# Patient Record
Sex: FEMALE | Race: BLACK OR AFRICAN AMERICAN | Employment: UNEMPLOYED | URBAN - METROPOLITAN AREA
[De-identification: names, ages, dates, MRNs, and addresses within clinical notes are randomized per-mention and may not be internally consistent; named-entity substitution may affect disease eponyms.]

---

## 2021-06-07 ENCOUNTER — TRANSFERRED RECORDS (OUTPATIENT)
Dept: HEALTH INFORMATION MANAGEMENT | Facility: CLINIC | Age: 26
End: 2021-06-07
Payer: COMMERCIAL

## 2021-12-13 ENCOUNTER — MEDICAL CORRESPONDENCE (OUTPATIENT)
Dept: HEALTH INFORMATION MANAGEMENT | Facility: CLINIC | Age: 26
End: 2021-12-13
Payer: COMMERCIAL

## 2021-12-13 ENCOUNTER — TRANSCRIBE ORDERS (OUTPATIENT)
Dept: OTHER | Age: 26
End: 2021-12-13

## 2021-12-13 DIAGNOSIS — G93.89 ENCEPHALOMALACIA: ICD-10-CM

## 2021-12-13 DIAGNOSIS — G43.119 INTRACTABLE MIGRAINE WITH AURA WITHOUT STATUS MIGRAINOSUS: Primary | ICD-10-CM

## 2022-03-13 ENCOUNTER — HEALTH MAINTENANCE LETTER (OUTPATIENT)
Age: 27
End: 2022-03-13

## 2022-04-12 ENCOUNTER — HOSPITAL ENCOUNTER (EMERGENCY)
Facility: CLINIC | Age: 27
Discharge: HOME | End: 2022-04-12
Attending: EMERGENCY MEDICINE | Admitting: EMERGENCY MEDICINE
Payer: COMMERCIAL

## 2022-04-12 VITALS
RESPIRATION RATE: 18 BRPM | HEIGHT: 62 IN | WEIGHT: 110 LBS | HEART RATE: 102 BPM | DIASTOLIC BLOOD PRESSURE: 77 MMHG | OXYGEN SATURATION: 100 % | TEMPERATURE: 98.5 F | BODY MASS INDEX: 20.24 KG/M2 | SYSTOLIC BLOOD PRESSURE: 118 MMHG

## 2022-04-12 DIAGNOSIS — G89.29 CHRONIC INTRACTABLE HEADACHE, UNSPECIFIED HEADACHE TYPE: ICD-10-CM

## 2022-04-12 DIAGNOSIS — M79.602 PAIN OF LEFT UPPER EXTREMITY: ICD-10-CM

## 2022-04-12 DIAGNOSIS — R51.9 CHRONIC INTRACTABLE HEADACHE, UNSPECIFIED HEADACHE TYPE: ICD-10-CM

## 2022-04-12 PROBLEM — G47.33 OSA (OBSTRUCTIVE SLEEP APNEA): Status: ACTIVE | Noted: 2022-04-12

## 2022-04-12 PROBLEM — R76.8 ANA POSITIVE: Status: ACTIVE | Noted: 2019-11-19

## 2022-04-12 PROBLEM — R45.851 SUICIDAL IDEATION: Status: ACTIVE | Noted: 2017-03-25

## 2022-04-12 PROBLEM — J34.3 HYPERTROPHY OF INFERIOR NASAL TURBINATE: Status: ACTIVE | Noted: 2020-01-13

## 2022-04-12 PROBLEM — R10.13 EPIGASTRIC PAIN: Status: ACTIVE | Noted: 2017-04-04

## 2022-04-12 LAB
ALBUMIN SERPL-MCNC: 3.6 G/DL (ref 3.4–5)
ALP SERPL-CCNC: 74 U/L (ref 40–150)
ALT SERPL W P-5'-P-CCNC: 21 U/L (ref 0–50)
ANION GAP SERPL CALCULATED.3IONS-SCNC: 7 MMOL/L (ref 3–14)
AST SERPL W P-5'-P-CCNC: 9 U/L (ref 0–45)
BASOPHILS # BLD AUTO: 0 10E3/UL (ref 0–0.2)
BASOPHILS NFR BLD AUTO: 0 %
BILIRUB SERPL-MCNC: <0.1 MG/DL (ref 0.2–1.3)
BUN SERPL-MCNC: 9 MG/DL (ref 7–30)
CALCIUM SERPL-MCNC: 8.9 MG/DL (ref 8.5–10.1)
CHLORIDE BLD-SCNC: 109 MMOL/L (ref 94–109)
CO2 SERPL-SCNC: 21 MMOL/L (ref 20–32)
CREAT SERPL-MCNC: 0.61 MG/DL (ref 0.52–1.04)
CRP SERPL-MCNC: 11.9 MG/L (ref 0–8)
D DIMER PPP FEU-MCNC: 0.33 UG/ML FEU (ref 0–0.5)
EOSINOPHIL # BLD AUTO: 0.2 10E3/UL (ref 0–0.7)
EOSINOPHIL NFR BLD AUTO: 2 %
ERYTHROCYTE [DISTWIDTH] IN BLOOD BY AUTOMATED COUNT: 14.1 % (ref 10–15)
GFR SERPL CREATININE-BSD FRML MDRD: >90 ML/MIN/1.73M2
GLUCOSE BLD-MCNC: 127 MG/DL (ref 70–99)
HCT VFR BLD AUTO: 39.2 % (ref 35–47)
HGB BLD-MCNC: 12.7 G/DL (ref 11.7–15.7)
HOLD SPECIMEN: NORMAL
IMM GRANULOCYTES # BLD: 0 10E3/UL
IMM GRANULOCYTES NFR BLD: 0 %
LACTATE SERPL-SCNC: 1.1 MMOL/L (ref 0.7–2)
LYMPHOCYTES # BLD AUTO: 3 10E3/UL (ref 0.8–5.3)
LYMPHOCYTES NFR BLD AUTO: 33 %
MCH RBC QN AUTO: 27.5 PG (ref 26.5–33)
MCHC RBC AUTO-ENTMCNC: 32.4 G/DL (ref 31.5–36.5)
MCV RBC AUTO: 85 FL (ref 78–100)
MONOCYTES # BLD AUTO: 0.6 10E3/UL (ref 0–1.3)
MONOCYTES NFR BLD AUTO: 7 %
NEUTROPHILS # BLD AUTO: 5.1 10E3/UL (ref 1.6–8.3)
NEUTROPHILS NFR BLD AUTO: 58 %
NRBC # BLD AUTO: 0 10E3/UL
NRBC BLD AUTO-RTO: 0 /100
PLATELET # BLD AUTO: 380 10E3/UL (ref 150–450)
POTASSIUM BLD-SCNC: 3.9 MMOL/L (ref 3.4–5.3)
PROT SERPL-MCNC: 8.5 G/DL (ref 6.8–8.8)
RBC # BLD AUTO: 4.61 10E6/UL (ref 3.8–5.2)
SODIUM SERPL-SCNC: 137 MMOL/L (ref 133–144)
WBC # BLD AUTO: 8.9 10E3/UL (ref 4–11)

## 2022-04-12 PROCEDURE — 36415 COLL VENOUS BLD VENIPUNCTURE: CPT | Performed by: EMERGENCY MEDICINE

## 2022-04-12 PROCEDURE — 96375 TX/PRO/DX INJ NEW DRUG ADDON: CPT

## 2022-04-12 PROCEDURE — 99285 EMERGENCY DEPT VISIT HI MDM: CPT | Mod: 25

## 2022-04-12 PROCEDURE — 96374 THER/PROPH/DIAG INJ IV PUSH: CPT

## 2022-04-12 PROCEDURE — 83605 ASSAY OF LACTIC ACID: CPT | Performed by: EMERGENCY MEDICINE

## 2022-04-12 PROCEDURE — 80053 COMPREHEN METABOLIC PANEL: CPT | Performed by: EMERGENCY MEDICINE

## 2022-04-12 PROCEDURE — 85025 COMPLETE CBC W/AUTO DIFF WBC: CPT | Performed by: EMERGENCY MEDICINE

## 2022-04-12 PROCEDURE — 258N000003 HC RX IP 258 OP 636: Performed by: EMERGENCY MEDICINE

## 2022-04-12 PROCEDURE — 85379 FIBRIN DEGRADATION QUANT: CPT | Performed by: EMERGENCY MEDICINE

## 2022-04-12 PROCEDURE — 250N000011 HC RX IP 250 OP 636: Performed by: EMERGENCY MEDICINE

## 2022-04-12 PROCEDURE — 86140 C-REACTIVE PROTEIN: CPT | Performed by: EMERGENCY MEDICINE

## 2022-04-12 PROCEDURE — 96361 HYDRATE IV INFUSION ADD-ON: CPT

## 2022-04-12 RX ORDER — CETIRIZINE HYDROCHLORIDE 10 MG/1
10 TABLET ORAL
COMMUNITY
End: 2022-04-12

## 2022-04-12 RX ORDER — ERENUMAB-AOOE 140 MG/ML
140 INJECTION, SOLUTION SUBCUTANEOUS
COMMUNITY
Start: 2022-03-10 | End: 2022-06-15

## 2022-04-12 RX ORDER — KETOROLAC TROMETHAMINE 15 MG/ML
15 INJECTION, SOLUTION INTRAMUSCULAR; INTRAVENOUS ONCE
Status: COMPLETED | OUTPATIENT
Start: 2022-04-12 | End: 2022-04-12

## 2022-04-12 RX ORDER — TRAZODONE HYDROCHLORIDE 150 MG/1
150 TABLET ORAL
COMMUNITY
End: 2022-04-12

## 2022-04-12 RX ORDER — SODIUM CHLORIDE 9 MG/ML
INJECTION, SOLUTION INTRAVENOUS CONTINUOUS
Status: DISCONTINUED | OUTPATIENT
Start: 2022-04-12 | End: 2022-04-12 | Stop reason: HOSPADM

## 2022-04-12 RX ORDER — METFORMIN HCL 500 MG
500 TABLET, EXTENDED RELEASE 24 HR ORAL 2 TIMES DAILY WITH MEALS
COMMUNITY
Start: 2021-02-12

## 2022-04-12 RX ORDER — ONDANSETRON 4 MG/1
4 TABLET, ORALLY DISINTEGRATING ORAL
COMMUNITY
Start: 2020-06-25 | End: 2022-04-12

## 2022-04-12 RX ORDER — ALBUTEROL SULFATE 90 UG/1
2 AEROSOL, METERED RESPIRATORY (INHALATION) EVERY 4 HOURS PRN
COMMUNITY
Start: 2021-06-07

## 2022-04-12 RX ORDER — HYDROMORPHONE HCL IN WATER/PF 6 MG/30 ML
0.2 PATIENT CONTROLLED ANALGESIA SYRINGE INTRAVENOUS
Status: COMPLETED | OUTPATIENT
Start: 2022-04-12 | End: 2022-04-12

## 2022-04-12 RX ORDER — DIPHENHYDRAMINE HYDROCHLORIDE 50 MG/ML
25 INJECTION INTRAMUSCULAR; INTRAVENOUS ONCE
Status: COMPLETED | OUTPATIENT
Start: 2022-04-12 | End: 2022-04-12

## 2022-04-12 RX ADMIN — PROCHLORPERAZINE EDISYLATE 10 MG: 5 INJECTION INTRAMUSCULAR; INTRAVENOUS at 20:47

## 2022-04-12 RX ADMIN — DIPHENHYDRAMINE HYDROCHLORIDE 25 MG: 50 INJECTION, SOLUTION INTRAMUSCULAR; INTRAVENOUS at 20:45

## 2022-04-12 RX ADMIN — SODIUM CHLORIDE: 9 INJECTION, SOLUTION INTRAVENOUS at 19:20

## 2022-04-12 RX ADMIN — HYDROMORPHONE HYDROCHLORIDE 0.2 MG: 0.2 INJECTION, SOLUTION INTRAMUSCULAR; INTRAVENOUS; SUBCUTANEOUS at 19:21

## 2022-04-12 RX ADMIN — KETOROLAC TROMETHAMINE 15 MG: 15 INJECTION, SOLUTION INTRAMUSCULAR; INTRAVENOUS at 20:46

## 2022-04-12 ASSESSMENT — ENCOUNTER SYMPTOMS
WEAKNESS: 1
VOMITING: 0
DIARRHEA: 0
BACK PAIN: 1
SPEECH DIFFICULTY: 1
HEADACHES: 1
MYALGIAS: 1
DYSURIA: 0

## 2022-04-12 NOTE — ED TRIAGE NOTES
[facility]  ED Arrival Note    Arrives through triage. ABC's intact. A &O X4. . Pt arrives with c/o burning headache that started suddenly 1.5 hrs ago. Reports L arm and leg weakness and pain. Speech is slow and slurred at times.       Visitors during triage: Mother      Triage Interventions: Direct rooming     Ambulatory: Yes    Meets Stroke Criteria?: Stroke Eval    Meets Trauma Criteria?: No    Directed to: Main ED    Pronouns: she/her

## 2022-04-12 NOTE — ED NOTES
Bed: ED20  Expected date:   Expected time:   Means of arrival:   Comments:  Thunder clap headache triage

## 2022-04-12 NOTE — ED PROVIDER NOTES
History   Chief Complaint:  Headache and Arm Pain     HPI   Elizabeth More is a 27 year old female with history of encephalomalacia and type II diabetes mellitus who presents with headache and left arm pain. The patient reports that she has had months of constant burning head pain and left sided weakness. A couple weeks ago, she began having left sided arm and back pain that worsens with movement. She also has new onset facial burning. Her symptoms have been the worst today. No dysuria, vomiting, or nausea. No known injuries. The patient's mother states that she began slurring her words about an hour and a half ago due to the facial pain. She can normally walk independently but has trouble sleeping because of the intermittent pain. She was recently taken off all of her medications, and was instructed to be kept comfortable. Last neurology appointment was one month ago.    MRI Brain W/O Contrast - 03/11/2022  Impressions:  1. No acute intracranial pathology.  2. Chronic encephalomalacia in the left posterior sherman-sylvian region.  3. A few nonspecific patchy T2-hyperintensities in the white matter appear similar to the prior MRI from 2019 and could be the result of migraine headaches versus trauma, infectious, inflammatory, or vascular insults.    Review of Systems   HENT:        Facial pain   Gastrointestinal: Negative for diarrhea and vomiting.   Genitourinary: Negative for dysuria.   Musculoskeletal: Positive for back pain and myalgias.   Neurological: Positive for speech difficulty, weakness and headaches.   All other systems reviewed and are negative.      Allergies:  Influenza virus vaccines    Medications:  None.    Past Medical History:   Encephalomalacia  Asthma  Type II diabetes mellitus   Depression  Migraines  Insomnia  Borderline intellectual functioning  Suicidal ideation  Obstructive sleep apnea    Family History:    Mother- seizures     Social History:  Presents with mother  Lives with  "mother    Physical Exam     Patient Vitals for the past 24 hrs:   BP Temp Temp src Pulse Resp SpO2 Height Weight   04/12/22 2106 -- -- -- -- -- 100 % -- --   04/12/22 2100 118/77 -- -- 102 -- 100 % -- --   04/12/22 2032 113/82 -- -- 91 -- 100 % -- --   04/12/22 2000 122/84 -- -- 94 -- 97 % -- --   04/12/22 1930 125/82 -- -- 83 -- 100 % -- --   04/12/22 1924 -- -- -- -- -- 100 % -- --   04/12/22 1922 (!) 131/94 -- -- 113 -- -- -- --   04/12/22 1841 127/87 98.5  F (36.9  C) Temporal 110 18 98 % 1.575 m (5' 2\") 49.9 kg (110 lb)     Physical Exam  Nursing note and vitals reviewed.    Constitutional:  Appears uncomfortable.   HENT:     Nose normal.  No discharge.      Oral mucosa is dry.  Eyes:    Conjunctivae are normal without injection.  Pupils are equal.  Cardiovascular:  Tachycardic, regular rhythm with normal S1 and S2.      Normal heart sounds and peripheral pulses 2+ and equal.       No murmur or kasey.  Pulmonary:  Effort normal and breath sounds clear to auscultation bilaterally.     No respiratory distress.  No stridor.     No wheezes. No rales.     GI:    Soft. No distension and no mass. No tenderness.    Musculoskeletal:  Normal range of motion.  She has no edema in her arms or legs.  She has good motion of the right arm and both legs without pain.  However when I try to move any of her left arm where she tries to move it she cries out in pain.  There is no deformity.  She has a strong pulse, normal capillary refill.  There is no erythema.  Does not matter where I touch on her left arm she says it hurts.  Neurological:   Alert and appropriate.  Her speech is not slurred but she does not want to move her jaw or mouth and so she talks with thick speech without enunciating words because she does not want to move her lips.     Coordination normal.      GCS eye subscore is 4. GCS verbal subscore is 5.      GCS motor subscore is 6.   Skin:    Skin is warm and dry. No rash noted.  No erythema.  Psychiatric: "   Behavior is normal. Appropriate mood and affect.     Judgment and thought content normal.     Emergency Department Course     Laboratory:  Labs Ordered and Resulted from Time of ED Arrival to Time of ED Departure   COMPREHENSIVE METABOLIC PANEL - Abnormal       Result Value    Sodium 137      Potassium 3.9      Chloride 109      Carbon Dioxide (CO2) 21      Anion Gap 7      Urea Nitrogen 9      Creatinine 0.61      Calcium 8.9      Glucose 127 (*)     Alkaline Phosphatase 74      AST 9      ALT 21      Protein Total 8.5      Albumin 3.6      Bilirubin Total <0.1 (*)     GFR Estimate >90     CRP INFLAMMATION - Abnormal    CRP Inflammation 11.9 (*)    D DIMER QUANTITATIVE - Normal    D-Dimer Quantitative 0.33     LACTIC ACID WHOLE BLOOD - Normal    Lactic Acid 1.1     CBC WITH PLATELETS AND DIFFERENTIAL    WBC Count 8.9      RBC Count 4.61      Hemoglobin 12.7      Hematocrit 39.2      MCV 85      MCH 27.5      MCHC 32.4      RDW 14.1      Platelet Count 380      % Neutrophils 58      % Lymphocytes 33      % Monocytes 7      % Eosinophils 2      % Basophils 0      % Immature Granulocytes 0      NRBCs per 100 WBC 0      Absolute Neutrophils 5.1      Absolute Lymphocytes 3.0      Absolute Monocytes 0.6      Absolute Eosinophils 0.2      Absolute Basophils 0.0      Absolute Immature Granulocytes 0.0      Absolute NRBCs 0.0        Emergency Department Course:    Reviewed:  I reviewed nursing notes, vitals, past medical history and Care Everywhere    Assessments:  1846 I obtained history and examined the patient as noted above.   2123 I rechecked the patient. Patient states her headache is tolerable.      Interventions:  Medications   HYDROmorphone (DILAUDID) injection 0.2 mg (0.2 mg Intravenous Given 4/12/22 1921)   ketorolac (TORADOL) injection 15 mg (15 mg Intravenous Given 4/12/22 2046)   prochlorperazine (COMPAZINE) injection 10 mg (10 mg Intravenous Given 4/12/22 2047)   diphenhydrAMINE (BENADRYL) injection 25 mg  (25 mg Intravenous Given 4/12/22 2045)     Disposition:  The patient was discharged to home.    Impression & Plan     CMS Diagnoses: None    Medical Decision Making:  Patient comes in with multiple chronic complaints including headache due to encephalomalacia chronically, face pain tonight, and left arm pain.  Blood work was obtained and her CRP came back elevated 11.9 but is only mild elevation, lactate is normal at 1.1, comprehensive metabolic panel is normal and CBC is normal with a white count of 8.9.  Her D-dimer is normal.  She initially was given 0.2 mg of Dilaudid which helped her feel better.  I reviewed her MRI scan that was done in the last month and there was a suggestion that the findings could also represent chronic migraine headaches.  She was given 15 mg of Toradol, 10 of Compazine, and 25 of Benadryl.  Her headache was not much better but her arm pain was about gone.  She got up and ambulated without difficulty which her mom said was hard at home.  I do not feel she needs more advanced imaging.  No findings to suggest a stroke.  These are chronic problems and I want her to follow-up with your doctor in the clinic.  Mom is going to be taking her home or back to the group home and they will follow up with her doctor.    It is okay if she takes a dose of Tylenol or ibuprofen once in a while.  Schedule an appointment with her primary physician for a recheck and to talk about next steps.    Diagnosis:    ICD-10-CM    1. Chronic intractable headache, unspecified headache type  R51.9     G89.29    2. Pain of left upper extremity  M79.602        Discharge Medications:  Discharge Medication List as of 4/12/2022  9:33 PM          Scribe Disclosure:  I, Selena Aquino, am serving as a scribe at 6:48 PM on 4/12/2022 to document services personally performed by Kami Junior MD based on my observations and the provider's statements to me.            Kami Junior MD  04/13/22 0154

## 2022-04-13 NOTE — PHARMACY-ADMISSION MEDICATION HISTORY
Pharmacy Medication History  Admission medication history interview status for the 4/12/2022  admission is complete. See EPIC admission navigator for prior to admission medications     Location of Interview: Patient room  Medication history sources: Patient, Patient's family/friend (pt's mother) and Surescripts    Significant changes made to the medication list:      In the past week, patient estimated taking medication this percent of the time: n/a    Additional medication history information:       Medication reconciliation completed by provider prior to medication history? No    Time spent in this activity: 10 minutes    Prior to Admission medications    Medication Sig Last Dose Taking? Auth Provider   AIMOVIG 140 MG/ML injection Inject 140 mg Subcutaneous every 30 days 3/18/2022 Yes Reported, Patient   albuterol (PROAIR HFA/PROVENTIL HFA/VENTOLIN HFA) 108 (90 Base) MCG/ACT inhaler Inhale 2 puffs into the lungs every 4 hours as needed for shortness of breath / dyspnea prn Yes Reported, Patient   metFORMIN (GLUCOPHAGE-XR) 500 MG 24 hr tablet Take 500 mg by mouth 2 times daily (with meals) 4/11/2022 Yes Reported, Patient       The information provided in this note is only as accurate as the sources available at the time of update(s)

## 2022-04-13 NOTE — DISCHARGE INSTRUCTIONS
It is okay if she takes a dose of Tylenol or ibuprofen once in a while.  Schedule an appointment with her primary physician for a recheck and to talk about next steps.

## 2022-05-08 ENCOUNTER — HEALTH MAINTENANCE LETTER (OUTPATIENT)
Age: 27
End: 2022-05-08

## 2022-06-15 ENCOUNTER — TELEPHONE (OUTPATIENT)
Dept: NEUROLOGY | Facility: CLINIC | Age: 27
End: 2022-06-15

## 2022-06-15 ENCOUNTER — OFFICE VISIT (OUTPATIENT)
Dept: NEUROLOGY | Facility: CLINIC | Age: 27
End: 2022-06-15
Payer: COMMERCIAL

## 2022-06-15 VITALS — WEIGHT: 201 LBS | HEIGHT: 62 IN | BODY MASS INDEX: 36.99 KG/M2

## 2022-06-15 DIAGNOSIS — G43.109 MIGRAINE WITH AURA AND WITHOUT STATUS MIGRAINOSUS, NOT INTRACTABLE: Primary | ICD-10-CM

## 2022-06-15 PROBLEM — E66.01 MORBID OBESITY (H): Status: ACTIVE | Noted: 2022-06-15

## 2022-06-15 PROBLEM — R10.13 EPIGASTRIC PAIN: Status: RESOLVED | Noted: 2017-04-04 | Resolved: 2022-06-15

## 2022-06-15 PROCEDURE — 99205 OFFICE O/P NEW HI 60 MIN: CPT | Performed by: PSYCHIATRY & NEUROLOGY

## 2022-06-15 RX ORDER — TRAZODONE HYDROCHLORIDE 50 MG/1
TABLET, FILM COATED ORAL
COMMUNITY
Start: 2020-08-24

## 2022-06-15 NOTE — LETTER
6/15/2022         RE: Elizabeth More  [address]  [place] [address]        Dear Colleague,    Thank you for referring your patient, Elizabeth More, to the [facility] [place]. Please see a copy of my visit note below.    NEUROLOGY CONSULTATION NOTE       [facility] [place]  [address] [place] [address]  Tel: [phone]  Fax: [phone]  [email or web address]     Elizabeth More,  1995, MRN [id number]  PCP: Ana Lopez  Date: 06/15/2022     ASSESSMENT & PLAN     Visit Diagnosis  1. Migraine with aura and without status migrainosus, not intractable     Migraine headache with aura  27-year-old female with history of major depressive disorder, borderline intellectual functioning, RICARDO, DM, morbid obesity and migraine headache with aura who was referred for evaluation and management of headaches.  Patient is a poor historian and most of the history was gathered from the chart.  She has tried multiple medication in the past including Botox injection, Aimovig with minimal relief.  I have recommended switching to Emgality with a loading dose of 240 mg followed by maintenance dose of 120 mg every 28 days.  For abortive treatment she will use Ubrelvy 50 mg at onset of headache.  She can repeat 1 tablet every 2 hours if needed, maximum 4 tablets in 24 hours.  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  Patient is somewhat vague and does not know when she had CVA or if any work-up was done.  She thinks she was admitted to [facility] or [facility] when she was turning 15 but I could not find any relevant admissions during that time.  It is also not clear if she had any hypercoagulable work-up done  echocardiogram was done recently that showed no cardiac source of emboli.  If not done previously I would recommend hypercoagulable work-up, lipid profile and to continue on aspirin    Thank you again for this referral, please feel free to contact me if you have any questions.    Leonardo Kincaid MD  [facility]  (Former[facility], [facility]     REASON FOR CONSULTATION Headache        HISTORY OF PRESENT ILLNESS     We have been requested by Dr. Ana Lopez to evaluate Elizabeth More who is a 27 year old  female for migraine headache    Patient is a 27-year-old female with history of major depressive disorder, borderline intellectual functioning, RICARDO, DM, morbid obesity and migraine headache with aura who was referred for evaluation and management of headaches.  Patient is a poor historian and most of the information was gathered from the chart.  Apparently she started having headaches at the age of 14.  He usually gets an aura followed by photophobia and phonophobia she also occasionally gets nausea vomiting.  She had imaging studies done in the past that showed chronic ischemia in the left parietal and posterior superior left temporal lobe.  MRA was unremarkable.  She does not know when she had that CVA and is unable to provide any meaningful history.  In the past she was seen at [facility] and had Botox injection that did not seem to help.  She has tried multiple medication and recently was on Aimovig but that too is not very effective.  She tends to get these headaches almost on a daily basis and due to her history of CVA does not take any abortive medication as she is afraid she might have another stroke.  She cannot identify any triggers for her headaches     PROBLEM LIST   Patient Active Problem List   Diagnosis Code     IKE positive R76.8     Borderline intellectual functioning R41.83     Episodic mood disorder (H) F39     Hypertrophy of inferior  nasal turbinate J34.3     Major depressive disorder F32.9     Migraine with aura G43.109     Moderate persistent asthma J45.40     RICARDO (obstructive sleep apnea) G47.33     Suicidal ideation R45.851     Type 2 diabetes mellitus without complication (H) E11.9     Morbid obesity (H) E66.01         PAST MEDICAL & SURGICAL HISTORY     Past Medical History:   Patient  has no past medical history on file.    Surgical History:  She  has no past surgical history on file.     SOCIAL HISTORY     Reviewed, and she  reports that she has never smoked. She has never used smokeless tobacco. She reports previous alcohol use.     FAMILY HISTORY     Reviewed, and family history includes Hyperlipidemia in her maternal grandmother; Hypertension in her father and maternal grandmother; Migraines in her father, maternal aunt, maternal grandmother, and paternal aunt; Multiple Sclerosis in her father; Seizure Disorder in her mother.     ALLERGIES     Allergies   Allergen Reactions     Dust Mite Extract Shortness Of Breath     Influenza Vaccines Swelling         REVIEW OF SYSTEMS     A 12 point review of system was performed and was negative except as outlined in the history of present illness.     HOME MEDICATIONS     Current Outpatient Rx   Medication Sig Dispense Refill     albuterol (PROAIR HFA/PROVENTIL HFA/VENTOLIN HFA) 108 (90 Base) MCG/ACT inhaler Inhale 2 puffs into the lungs every 4 hours as needed for shortness of breath / dyspnea       galcanezumab-gnlm (EMGALITY) 120 MG/ML injection Inject 2 mLs (240 mg) Subcutaneous once for 1 dose 2 mL 0     [START ON 7/15/2022] galcanezumab-gnlm (EMGALITY) 120 MG/ML injection Inject 1 mL (120 mg) Subcutaneous every 28 days THIS IS MAINTENANCE PRESCRIPTION & SHOULD BE FILLED ON THE STARTING DATE 1 mL 11     metFORMIN (GLUCOPHAGE-XR) 500 MG 24 hr tablet Take 500 mg by mouth 2 times daily (with meals)       traZODone (DESYREL) 50 MG tablet TAKE 3 TABLETS BY MOUTH AT BEDTIME       ubrogepant  "(UBRELVY) 50 MG tablet Take 1 tablet (50 mg) by mouth at onset of headache (May repeat Q2Hour if needed. Maximum 4 tablets in 24 hours) 15 tablet 11         PHYSICAL EXAM     Vital signs  Ht 1.575 m (5' 2\")   Wt 91.2 kg (201 lb)   BMI 36.76 kg/m      Weight:   201 lbs 0 oz    Young overweight female who is alert and oriented x3 no acute distress.  Vital signs were reviewed and are documented in electronic medical record.  Neck supple.  Neurologically speech was normal.  Cranial nerves II through XII are intact.  Motor strength 5/5.  Reflexes 1+ toes downgoing.  No dysmetria noted on finger-nose testing gait normal     PERTINENT DIAGNOSTIC STUDIES     Following studies were reviewed:     MRI, MRA HEAD AND NECK 5/18/2022  HEAD MRI:   1.  Moderate zone of chronic ischemic change in the left parietal and posterior superior left temporal lobe, as detailed above.   2.  Scattered nonspecific foci of T2/FLAIR hyperintense signal in the cerebral white matter may be associated with gliosis or chronic myelin loss.   3.  No acute infarct, acute intracranial hemorrhage, or intracranial mass.     HEAD MRA:   1.  No aneurysm, high flow AVM or significant stenosis identified.   2.  Variant [place] of [place] anatomy as above.     NECK MRA:   1.  No significant stenosis of the bilateral internal carotid arteries based on NASCET criteria.    MRI BRAIN 3/11/2022  1. No acute intracranial pathology.   2. Chronic encephalomalacia in the left posterior perisylvian region.   3. A few nonspecific patchy T2-hyperintensities in the white matter appear similar to the prior MRI from 2019 and could be the result of migraine headaches versus trauma, infectious, inflammatory, or vascular insults.     ECHOCARDIOGRAM 5/10/2022  1. The left ventricular size is normal.  Systolic function is normal.     The   estimated ejection fraction is 65-70%.  Wall thickness is normal.  Left   ventricular segmental wall motion is normal.     2. The right " ventricular systolic function is normal..     3. Negative bubble study, with and without Valsalva.     4. Normal biatrial size.     5. Normal valvular structure and function.     6. Normal sinus rhythm during study.        PERTINENT LABS  Following labs were reviewed:  Admission on 04/12/2022, Discharged on 04/12/2022   Component Date Value     Hold Specimen 04/12/2022 JIC      Hold Specimen 04/12/2022 JIC      Hold Specimen 04/12/2022 JI      D-Dimer Quantitative 04/12/2022 0.33      Sodium 04/12/2022 137      Potassium 04/12/2022 3.9      Chloride 04/12/2022 109      Carbon Dioxide (CO2) 04/12/2022 21      Anion Gap 04/12/2022 7      Urea Nitrogen 04/12/2022 9      Creatinine 04/12/2022 0.61      Calcium 04/12/2022 8.9      Glucose 04/12/2022 127 (A)     Alkaline Phosphatase 04/12/2022 74      AST 04/12/2022 9      ALT 04/12/2022 21      Protein Total 04/12/2022 8.5      Albumin 04/12/2022 3.6      Bilirubin Total 04/12/2022 <0.1 (A)     GFR Estimate 04/12/2022 >90      Lactic Acid 04/12/2022 1.1      CRP Inflammation 04/12/2022 11.9 (A)     WBC Count 04/12/2022 8.9      RBC Count 04/12/2022 4.61      Hemoglobin 04/12/2022 12.7      Hematocrit 04/12/2022 39.2      MCV 04/12/2022 85      MCH 04/12/2022 27.5      MCHC 04/12/2022 32.4      RDW 04/12/2022 14.1      Platelet Count 04/12/2022 380      % Neutrophils 04/12/2022 58      % Lymphocytes 04/12/2022 33      % Monocytes 04/12/2022 7      % Eosinophils 04/12/2022 2      % Basophils 04/12/2022 0      % Immature Granulocytes 04/12/2022 0      NRBCs per 100 WBC 04/12/2022 0      Absolute Neutrophils 04/12/2022 5.1      Absolute Lymphocytes 04/12/2022 3.0      Absolute Monocytes 04/12/2022 0.6      Absolute Eosinophils 04/12/2022 0.2      Absolute Basophils 04/12/2022 0.0      Absolute Immature Granul* 04/12/2022 0.0      Absolute NRBCs 04/12/2022 0.0         Total time spent for face to face visit, reviewing labs/imaging studies, counseling and coordination of care  was: 1 Hour spent on the date of the encounter doing chart review, review of outside records, review of test results, interpretation of tests, patient visit and documentation       This note was dictated using voice recognition software.  Any grammatical or context distortions are unintentional and inherent to the software.    No orders of the defined types were placed in this encounter.     New Prescriptions    GALCANEZUMAB-GNLM (EMGALITY) 120 MG/ML INJECTION    Inject 2 mLs (240 mg) Subcutaneous once for 1 dose    GALCANEZUMAB-GNLM (EMGALITY) 120 MG/ML INJECTION    Inject 1 mL (120 mg) Subcutaneous every 28 days THIS IS MAINTENANCE PRESCRIPTION & SHOULD BE FILLED ON THE STARTING DATE    UBROGEPANT (UBRELVY) 50 MG TABLET    Take 1 tablet (50 mg) by mouth at onset of headache (May repeat Q2Hour if needed. Maximum 4 tablets in 24 hours)      Modified Medications    No medications on file              Again, thank you for allowing me to participate in the care of your patient.        Sincerely,        Leonardo Kincaid MD

## 2022-06-15 NOTE — TELEPHONE ENCOUNTER
Prior Authorization Retail Medication Request    Medication/Dose: Emgality 240mg/m; (loading dose) AND 120mg/ml (maintenance dose)  ICD code (if different than what is on RX):    Previously Tried and Failed:    Rationale:      Insurance Name:    Insurance ID:        Pharmacy Information (if different than what is on RX)  Name:    Phone:

## 2022-06-15 NOTE — NURSING NOTE
EXAM: MR BRAIN W/WO IV CONT, MR ANGIO HEAD WO IV CONT, MR ANGIO NECK W/WO IV CONT   LOCATION: [facility]   DATE/TIME: 5/18/2022 1:27 PM     INDICATION: Stroke follow-up.   COMPARISON: None.   CONTRAST: GADOBUTROL 1 MMOL/ML IV SOLN 9 mL   TECHNIQUE:   1) Routine multiplanar multisequence head MRI without and with intravenous contrast.   2) 3D time-of-flight head MRA without intravenous contrast.   3) Neck MRA without and with IV contrast. Stenosis measurements made according to NASCET criteria unless otherwise specified.     FINDINGS:   HEAD MRI:   INTRACRANIAL CONTENTS: No acute or subacute infarct. There is a moderate zone of encephalomalacia and parenchymal gliosis involving the left parietal lobe in the posterior left MCA vascular distribution. No extra-axial fluid collection. No mass effect or midline shift. Scattered nonspecific foci of T2/FLAIR hyperintense signal in the cerebral white matter. There is slight asymmetric dilatation of the atrium and occipital horn of the left lateral ventricle. No evidence of hydrocephalus. Normal position of the cerebellar tonsils. No pathologic contrast enhancement.     SELLA: No abnormality accounting for technique.     OSSEOUS STRUCTURES/SOFT TISSUES: Normal marrow signal. The major intracranial vascular flow voids are maintained.     ORBITS: No abnormality accounting for technique.     SINUSES/MASTOIDS: No paranasal sinus mucosal disease. No middle ear or mastoid effusion.     HEAD MRA:   ANTERIOR CIRCULATION: No stenosis/occlusion, aneurysm, or high flow vascular malformation. Standard [place] of [place] anatomy.     POSTERIOR CIRCULATION: No stenosis/occlusion, aneurysm, or high flow vascular malformation. Balanced vertebral arteries supply a normal basilar artery.     NECK MRA:   RIGHT CAROTID: No measurable stenosis or dissection.     LEFT CAROTID: No measurable stenosis or dissection.     VERTEBRAL ARTERIES: No focal stenosis or dissection. Dominant right and  smaller left vertebral arteries.     AORTIC ARCH: Classic aortic arch anatomy with no significant stenosis at the origin of the great vessels.     IMPRESSION:   HEAD MRI:   1.  Moderate zone of chronic ischemic change in the left parietal and posterior superior left temporal lobe, as detailed above.   2.  Scattered nonspecific foci of T2/FLAIR hyperintense signal in the cerebral white matter may be associated with gliosis or chronic myelin loss.   3.  No acute infarct, acute intracranial hemorrhage, or intracranial mass.     HEAD MRA:   1.  No aneurysm, high flow AVM or significant stenosis identified.   2.  Variant [place] of [place] anatomy as above.     NECK MRA:   1.  No significant stenosis of the bilateral internal carotid arteries based on NASCET criteria.

## 2022-06-15 NOTE — NURSING NOTE
Chief Complaint   Patient presents with     Headache     Chronic daily headaches- lightheadedness, visual disturbances- Imaging done via [place] [facility] 5/18/22. Is currently on Aimovig and does not feel that it helps      Natalee Campbell CMA on 6/15/2022 at 1:43 PM

## 2022-06-20 NOTE — TELEPHONE ENCOUNTER
PA Initiation    Medication: Emgality 240mg/m; (loading dose) AND 120mg/ml (maintenance dose)  Insurance Company: [employer] - Phone [phone] Fax [phone]  Pharmacy Filling the Rx: [id number] [address]  Filling Pharmacy Phone: [phone]  Filling Pharmacy Fax: [phone]  Start Date: 6/20/2022

## 2022-06-20 NOTE — TELEPHONE ENCOUNTER
Prior Authorization Approval    Authorization Effective Date: 5/21/2022  Authorization Expiration Date: 6/20/2023  Medication: Emgality 240mg/m; (loading dose) AND 120mg/ml (maintenance dose)  Approved Dose/Quantity:   Reference #: [id number]   Insurance Company: [employer] - Phone [phone] Fax [phone]  Which Pharmacy is filling the prescription (Not needed for infusion/clinic administered): [id number] [address]  Pharmacy Notified: Yes  Patient Notified: Yes

## 2023-01-14 ENCOUNTER — HEALTH MAINTENANCE LETTER (OUTPATIENT)
Age: 28
End: 2023-01-14

## 2023-06-02 ENCOUNTER — HEALTH MAINTENANCE LETTER (OUTPATIENT)
Age: 28
End: 2023-06-02

## 2023-07-03 DIAGNOSIS — G43.109 MIGRAINE WITH AURA AND WITHOUT STATUS MIGRAINOSUS, NOT INTRACTABLE: ICD-10-CM

## 2023-07-03 RX ORDER — GALCANEZUMAB 120 MG/ML
120 INJECTION, SOLUTION SUBCUTANEOUS
Qty: 1 ML | Refills: 0 | Status: SHIPPED | OUTPATIENT
Start: 2023-07-03 | End: 2023-08-07

## 2023-07-03 NOTE — LETTER
7/3/2023        RE: Elizabeth More  [address]  [place] [address]                Dear Elizabeth,         We recently provided you with medication refills.  Many medications require routine follow-up with your doctor.    Your prescription(s) have been refilled for 30 days so you may have time for the above noted follow-up. Please call to schedule soon so we can assure you have an appointment before your next refills are needed. If you have already made a follow up appointment, please disregard this letter.         Sincerely,        [facility] [place]     (Formerly known as [facility])  Dr. Kincaid's Care Team

## 2023-07-03 NOTE — TELEPHONE ENCOUNTER
Refill request for: Emgality 120mg/ml   Directions: Inject 1 mL (120 mg) Subcutaneous every 28 days     LOV: 06/15/22   NOV: No future appt scheduled. Letter mailed to pt to remind to schedule.    30 day supply with 0 refills Medication T'd for review and signature    Gina Cano LPN on 7/3/2023 at 12:43 PM

## 2023-07-16 ENCOUNTER — HEALTH MAINTENANCE LETTER (OUTPATIENT)
Age: 28
End: 2023-07-16

## 2023-08-06 DIAGNOSIS — G43.109 MIGRAINE WITH AURA AND WITHOUT STATUS MIGRAINOSUS, NOT INTRACTABLE: ICD-10-CM

## 2023-08-07 RX ORDER — GALCANEZUMAB 120 MG/ML
120 INJECTION, SOLUTION SUBCUTANEOUS
Qty: 1 ML | Refills: 0 | Status: SHIPPED | OUTPATIENT
Start: 2023-08-07 | End: 2023-09-06

## 2023-08-07 NOTE — TELEPHONE ENCOUNTER
Refill request for: Emgality 120mg/ml   Directions: Inject 1 mL (120 mg) Subcutaneous every 28 days      LOV: 06/15/22   NOV: No future appt scheduled. Letter mailed to pt to remind to schedule.     30 day supply with 0 refills Medication T'd for review and signature  Natalee Campbell CMA on 8/7/2023 at 1:29 PM

## 2023-11-09 NOTE — PROGRESS NOTES
NEUROLOGY FOLLOW UP VISIT  NOTE       [facility] [place]  [address] [place] [address]  Tel: [phone]  Fax: [phone]  [email or web address]     Elizabeth More,  1995, MRN [id number]  PCP: No Ref-Primary, Physician  Date: 11/10/2023      ASSESSMENT & PLAN     Visit Diagnosis  Migraine with aura and without status migrainosus, not intractable     Migraine headache with aura  28-year-old female with history of major depression, borderline intellectual functioning, RICARDO, DM, morbid obesity and migraine headache with aura who returns for follow-up.  Previously she had failed multiple medication but she finds Emgality to be very effective.  For abortive treatment she is using Ubrelvy.  I have cautioned her to minimize the use of Ubrelvy.  I have refilled her prescriptions both for Emgality and Ubrelvy.  Follow-up will be in 1 year    Thank you again for this referral, please feel free to contact me if you have any questions.    Leonardo Kincaid MD  [facility]  ([facility], [facility]     HISTORY OF PRESENT ILLNESS     Patient is a 28-year-old female with history of major depression, borderline intellectual functioning, RICARDO, DM, morbid obesity and migraine headache with aura last seen on 6/15/2022 who returns for follow-up.  In the past she had tried multiple medication including Botox, Aimovig with minimal relief.  She was switched to Emgality during her last visit and was told to use Ubrelvy for abortive treatment.  She reports improvement in her headache with Emgality and although initially she was using Ubrelvy infrequently lately she finds herself using it at least once a week.     Apparently she started having headaches at the age of 14.  He usually gets an aura followed by photophobia and phonophobia she also occasionally gets nausea vomiting.  She had imaging studies done in the past that showed  chronic ischemia in the left parietal and posterior superior left temporal lobe.  MRA was unremarkable.  She does not know when she had that CVA and is unable to provide any meaningful history.       PROBLEM LIST   Patient Active Problem List   Diagnosis Code     IKE positive R76.8     Borderline intellectual functioning R41.83     Episodic mood disorder (H24) F39     Hypertrophy of inferior nasal turbinate J34.3     Major depressive disorder F32.9     Migraine with aura G43.109     Moderate persistent asthma J45.40     RICARDO (obstructive sleep apnea) G47.33     Suicidal ideation R45.851     Type 2 diabetes mellitus without complication (H) E11.9     Morbid obesity (H) E66.01         PAST MEDICAL & SURGICAL HISTORY     Past Medical History:   Patient  has no past medical history on file.    Surgical History:  She  has no past surgical history on file.     SOCIAL HISTORY     Reviewed, and she  reports that she has never smoked. She has never used smokeless tobacco. She reports that she does not currently use alcohol.     FAMILY HISTORY     Reviewed, and family history includes Hyperlipidemia in her maternal grandmother; Hypertension in her father and maternal grandmother; Migraines in her father, maternal aunt, maternal grandmother, and paternal aunt; Multiple Sclerosis in her father; Seizure Disorder in her mother.     ALLERGIES     Allergies   Allergen Reactions     Dust Mite Extract Shortness Of Breath     Influenza Vaccines Swelling         REVIEW OF SYSTEMS     A 12 point review of system was performed and was negative except as outlined in the history of present illness.     HOME MEDICATIONS     Current Outpatient Rx   Medication Sig Dispense Refill     albuterol (PROAIR HFA/PROVENTIL HFA/VENTOLIN HFA) 108 (90 Base) MCG/ACT inhaler Inhale 2 puffs into the lungs every 4 hours as needed for shortness of breath / dyspnea       ALLERGY RELIEF 180 MG tablet Take 1 tablet by mouth daily at 2 pm       galcanezumab-kali  "(EMGALITY) 120 MG/ML injection Inject 120 mg Subcutaneous every 30 days       metFORMIN (GLUCOPHAGE-XR) 500 MG 24 hr tablet Take 500 mg by mouth 2 times daily (with meals)       traZODone (DESYREL) 50 MG tablet TAKE 3 TABLETS BY MOUTH AT BEDTIME       ubrogepant (UBRELVY) 50 MG tablet Take 1 tablet (50 mg) by mouth at onset of headache (May repeat Q2Hour if needed. Maximum 4 tablets in 24 hours) 15 tablet 11         PHYSICAL EXAM     Vital signs  /67 (BP Location: Left arm, Patient Position: Sitting)   Pulse 99   Ht 1.575 m (5' 2\")   Wt 86.2 kg (190 lb)   BMI 34.75 kg/m      Weight:   190 lbs 0 oz    Young overweight female who is alert and oriented x3 no acute distress.  Vital signs were reviewed and are documented in electronic medical record.  Neck supple.  Neurologically speech was normal.  Cranial nerves II through XII are intact.  Motor strength 5/5.  Reflexes 1+ toes downgoing.  No dysmetria noted on finger-nose testing gait normal      PERTINENT DIAGNOSTIC STUDIES     Following studies were reviewed:     MRI, MRA HEAD AND NECK 5/18/2022  HEAD MRI:   1.  Moderate zone of chronic ischemic change in the left parietal and posterior superior left temporal lobe, as detailed above.   2.  Scattered nonspecific foci of T2/FLAIR hyperintense signal in the cerebral white matter may be associated with gliosis or chronic myelin loss.   3.  No acute infarct, acute intracranial hemorrhage, or intracranial mass.     HEAD MRA:   1.  No aneurysm, high flow AVM or significant stenosis identified.   2.  Variant [place] of Jones anatomy as above.     NECK MRA:   1.  No significant stenosis of the bilateral internal carotid arteries based on NASCET criteria.     MRI BRAIN 3/11/2022  1. No acute intracranial pathology.   2. Chronic encephalomalacia in the left posterior perisylvian region.   3. A few nonspecific patchy T2-hyperintensities in the white matter appear similar to the prior MRI from 2019 and could be the " result of migraine headaches versus trauma, infectious, inflammatory, or vascular insults.      ECHOCARDIOGRAM 5/10/2022  1. The left ventricular size is normal.  Systolic function is normal.     The   estimated ejection fraction is 65-70%.  Wall thickness is normal.  Left   ventricular segmental wall motion is normal.     2. The right ventricular systolic function is normal..     3. Negative bubble study, with and without Valsalva.     4. Normal biatrial size.     5. Normal valvular structure and function.     6. Normal sinus rhythm during study.      PERTINENT LABS  Following labs were reviewed:  No visits with results within 3 Month(s) from this visit.   Latest known visit with results is:   Admission on 04/12/2022, Discharged on 04/12/2022   Component Date Value Ref Range Status     Hold Specimen 04/12/2022 JI   Final     Hold Specimen 04/12/2022 [facility]   Final     Hold Specimen 04/12/2022 [facility]   Final     D-Dimer Quantitative 04/12/2022 0.33  0.00 - 0.50 ug/mL FEU Final     Sodium 04/12/2022 137  133 - 144 mmol/L Final     Potassium 04/12/2022 3.9  3.4 - 5.3 mmol/L Final     Chloride 04/12/2022 109  94 - 109 mmol/L Final     Carbon Dioxide (CO2) 04/12/2022 21  20 - 32 mmol/L Final     Anion Gap 04/12/2022 7  3 - 14 mmol/L Final     Urea Nitrogen 04/12/2022 9  7 - 30 mg/dL Final     Creatinine 04/12/2022 0.61  0.52 - 1.04 mg/dL Final     Calcium 04/12/2022 8.9  8.5 - 10.1 mg/dL Final     Glucose 04/12/2022 127 (H)  70 - 99 mg/dL Final     Alkaline Phosphatase 04/12/2022 74  40 - 150 U/L Final     AST 04/12/2022 9  0 - 45 U/L Final     ALT 04/12/2022 21  0 - 50 U/L Final     Protein Total 04/12/2022 8.5  6.8 - 8.8 g/dL Final     Albumin 04/12/2022 3.6  3.4 - 5.0 g/dL Final     Bilirubin Total 04/12/2022 <0.1 (L)  0.2 - 1.3 mg/dL Final     GFR Estimate 04/12/2022 >90  >60 mL/min/1.73m2 Final     Lactic Acid 04/12/2022 1.1  0.7 - 2.0 mmol/L Final     CRP Inflammation 04/12/2022 11.9 (H)  0.0 - 8.0 mg/L Final     WBC  Count 04/12/2022 8.9  4.0 - 11.0 10e3/uL Final     RBC Count 04/12/2022 4.61  3.80 - 5.20 10e6/uL Final     Hemoglobin 04/12/2022 12.7  11.7 - 15.7 g/dL Final     Hematocrit 04/12/2022 39.2  35.0 - 47.0 % Final     MCV 04/12/2022 85  78 - 100 fL Final     MCH 04/12/2022 27.5  26.5 - 33.0 pg Final     MCHC 04/12/2022 32.4  31.5 - 36.5 g/dL Final     RDW 04/12/2022 14.1  10.0 - 15.0 % Final     Platelet Count 04/12/2022 380  150 - 450 10e3/uL Final     % Neutrophils 04/12/2022 58  % Final     % Lymphocytes 04/12/2022 33  % Final     % Monocytes 04/12/2022 7  % Final     % Eosinophils 04/12/2022 2  % Final     % Basophils 04/12/2022 0  % Final     % Immature Granulocytes 04/12/2022 0  % Final     NRBCs per 100 WBC 04/12/2022 0  <1 /100 Final     Absolute Neutrophils 04/12/2022 5.1  1.6 - 8.3 10e3/uL Final     Absolute Lymphocytes 04/12/2022 3.0  0.8 - 5.3 10e3/uL Final     Absolute Monocytes 04/12/2022 0.6  0.0 - 1.3 10e3/uL Final     Absolute Eosinophils 04/12/2022 0.2  0.0 - 0.7 10e3/uL Final     Absolute Basophils 04/12/2022 0.0  0.0 - 0.2 10e3/uL Final     Absolute Immature Granulocytes 04/12/2022 0.0  <=0.4 10e3/uL Final     Absolute NRBCs 04/12/2022 0.0  10e3/uL Final         Total time spent for face to face visit, reviewing labs/imaging studies, counseling and coordination of care was: 30 Minutes spent on the date of the encounter doing chart review, review of outside records, review of test results, interpretation of tests, patient visit, and documentation     This note was dictated using voice recognition software.  Any grammatical or context distortions are unintentional and inherent to the software.    No orders of the defined types were placed in this encounter.     New Prescriptions    No medications on file     Modified Medications    No medications on file

## 2023-11-10 ENCOUNTER — OFFICE VISIT (OUTPATIENT)
Dept: NEUROLOGY | Facility: CLINIC | Age: 28
End: 2023-11-10
Payer: COMMERCIAL

## 2023-11-10 VITALS
SYSTOLIC BLOOD PRESSURE: 101 MMHG | BODY MASS INDEX: 34.96 KG/M2 | HEIGHT: 62 IN | HEART RATE: 99 BPM | WEIGHT: 190 LBS | DIASTOLIC BLOOD PRESSURE: 67 MMHG

## 2023-11-10 DIAGNOSIS — G43.109 MIGRAINE WITH AURA AND WITHOUT STATUS MIGRAINOSUS, NOT INTRACTABLE: Primary | ICD-10-CM

## 2023-11-10 PROCEDURE — 99214 OFFICE O/P EST MOD 30 MIN: CPT | Performed by: PSYCHIATRY & NEUROLOGY

## 2023-11-10 RX ORDER — FEXOFENADINE HYDROCHLORIDE 180 MG/1
1 TABLET, FILM COATED ORAL
COMMUNITY
Start: 2023-08-08

## 2023-11-10 RX ORDER — GALCANEZUMAB 120 MG/ML
120 INJECTION, SOLUTION SUBCUTANEOUS
COMMUNITY
Start: 2023-07-03 | End: 2023-11-10

## 2023-11-10 RX ORDER — GALCANEZUMAB 120 MG/ML
120 INJECTION, SOLUTION SUBCUTANEOUS
Qty: 1 ML | Refills: 11 | Status: SHIPPED | OUTPATIENT
Start: 2023-11-10

## 2023-11-10 NOTE — LETTER
11/10/2023         RE: Elizabeth More  [address]  [place] [address]        Dear Colleague,    Thank you for referring your patient, Elizabeth More, to the [facility] [place]. Please see a copy of my visit note below.    NEUROLOGY FOLLOW UP VISIT  NOTE       [facility] [place]  [address] [place] [address]  Tel: [phone]  Fax: [phone]  [email or web address]     Elizabeth More,  1995, MRN [id number]  PCP: No Ref-Primary, Physician  Date: 11/10/2023      ASSESSMENT & PLAN     Visit Diagnosis  Migraine with aura and without status migrainosus, not intractable     Migraine headache with aura  28-year-old female with history of major depression, borderline intellectual functioning, RICARDO, DM, morbid obesity and migraine headache with aura who returns for follow-up.  Previously she had failed multiple medication but she finds Emgality to be very effective.  For abortive treatment she is using Ubrelvy.  I have cautioned her to minimize the use of Ubrelvy.  I have refilled her prescriptions both for Emgality and Ubrelvy.  Follow-up will be in 1 year    Thank you again for this referral, please feel free to contact me if you have any questions.    Leonardo Kincaid MD  [facility], [place]  (Former[facility], [facility]     HISTORY OF PRESENT ILLNESS     Patient is a 28-year-old female with history of major depression, borderline intellectual functioning, RICARDO, DM, morbid obesity and migraine headache with aura last seen on 6/15/2022 who returns for follow-up.  In the past she had tried multiple medication including Botox, Aimovig with minimal relief.  She was switched to Emgality during her last visit and was told to use Ubrelvy for abortive treatment.  She reports improvement in her headache with Emgality and although initially she was using Ubrelvy infrequently  lately she finds herself using it at least once a week.     Apparently she started having headaches at the age of 14.  He usually gets an aura followed by photophobia and phonophobia she also occasionally gets nausea vomiting.  She had imaging studies done in the past that showed chronic ischemia in the left parietal and posterior superior left temporal lobe.  MRA was unremarkable.  She does not know when she had that CVA and is unable to provide any meaningful history.       PROBLEM LIST   Patient Active Problem List   Diagnosis Code     IKE positive R76.8     Borderline intellectual functioning R41.83     Episodic mood disorder (H24) F39     Hypertrophy of inferior nasal turbinate J34.3     Major depressive disorder F32.9     Migraine with aura G43.109     Moderate persistent asthma J45.40     RICARDO (obstructive sleep apnea) G47.33     Suicidal ideation R45.851     Type 2 diabetes mellitus without complication (H) E11.9     Morbid obesity (H) E66.01         PAST MEDICAL & SURGICAL HISTORY     Past Medical History:   Patient  has no past medical history on file.    Surgical History:  She  has no past surgical history on file.     SOCIAL HISTORY     Reviewed, and she  reports that she has never smoked. She has never used smokeless tobacco. She reports that she does not currently use alcohol.     FAMILY HISTORY     Reviewed, and family history includes Hyperlipidemia in her maternal grandmother; Hypertension in her father and maternal grandmother; Migraines in her father, maternal aunt, maternal grandmother, and paternal aunt; Multiple Sclerosis in her father; Seizure Disorder in her mother.     ALLERGIES     Allergies   Allergen Reactions     Dust Mite Extract Shortness Of Breath     Influenza Vaccines Swelling         REVIEW OF SYSTEMS     A 12 point review of system was performed and was negative except as outlined in the history of present illness.     HOME MEDICATIONS     Current Outpatient Rx   Medication Sig  "Dispense Refill     albuterol (PROAIR HFA/PROVENTIL HFA/VENTOLIN HFA) 108 (90 Base) MCG/ACT inhaler Inhale 2 puffs into the lungs every 4 hours as needed for shortness of breath / dyspnea       ALLERGY RELIEF 180 MG tablet Take 1 tablet by mouth daily at 2 pm       galcanezumab-gnlm (EMGALITY) 120 MG/ML injection Inject 120 mg Subcutaneous every 30 days       metFORMIN (GLUCOPHAGE-XR) 500 MG 24 hr tablet Take 500 mg by mouth 2 times daily (with meals)       traZODone (DESYREL) 50 MG tablet TAKE 3 TABLETS BY MOUTH AT BEDTIME       ubrogepant (UBRELVY) 50 MG tablet Take 1 tablet (50 mg) by mouth at onset of headache (May repeat Q2Hour if needed. Maximum 4 tablets in 24 hours) 15 tablet 11         PHYSICAL EXAM     Vital signs  /67 (BP Location: Left arm, Patient Position: Sitting)   Pulse 99   Ht 1.575 m (5' 2\")   Wt 86.2 kg (190 lb)   BMI 34.75 kg/m      Weight:   190 lbs 0 oz    Young overweight female who is alert and oriented x3 no acute distress.  Vital signs were reviewed and are documented in electronic medical record.  Neck supple.  Neurologically speech was normal.  Cranial nerves II through XII are intact.  Motor strength 5/5.  Reflexes 1+ toes downgoing.  No dysmetria noted on finger-nose testing gait normal      PERTINENT DIAGNOSTIC STUDIES     Following studies were reviewed:     MRI, MRA HEAD AND NECK 5/18/2022  HEAD MRI:   1.  Moderate zone of chronic ischemic change in the left parietal and posterior superior left temporal lobe, as detailed above.   2.  Scattered nonspecific foci of T2/FLAIR hyperintense signal in the cerebral white matter may be associated with gliosis or chronic myelin loss.   3.  No acute infarct, acute intracranial hemorrhage, or intracranial mass.     HEAD MRA:   1.  No aneurysm, high flow AVM or significant stenosis identified.   2.  Variant [place] of Jones anatomy as above.     NECK MRA:   1.  No significant stenosis of the bilateral internal carotid arteries based on " NASCET criteria.     MRI BRAIN 3/11/2022  1. No acute intracranial pathology.   2. Chronic encephalomalacia in the left posterior perisylvian region.   3. A few nonspecific patchy T2-hyperintensities in the white matter appear similar to the prior MRI from 2019 and could be the result of migraine headaches versus trauma, infectious, inflammatory, or vascular insults.      ECHOCARDIOGRAM 5/10/2022  1. The left ventricular size is normal.  Systolic function is normal.     The   estimated ejection fraction is 65-70%.  Wall thickness is normal.  Left   ventricular segmental wall motion is normal.     2. The right ventricular systolic function is normal..     3. Negative bubble study, with and without Valsalva.     4. Normal biatrial size.     5. Normal valvular structure and function.     6. Normal sinus rhythm during study.      PERTINENT LABS  Following labs were reviewed:  No visits with results within 3 Month(s) from this visit.   Latest known visit with results is:   Admission on 04/12/2022, Discharged on 04/12/2022   Component Date Value Ref Range Status     Hold Specimen 04/12/2022 [facility]   Final     Hold Specimen 04/12/2022 [facility]I   Final     Hold Specimen 04/12/2022 [facility]   Final     D-Dimer Quantitative 04/12/2022 0.33  0.00 - 0.50 ug/mL FEU Final     Sodium 04/12/2022 137  133 - 144 mmol/L Final     Potassium 04/12/2022 3.9  3.4 - 5.3 mmol/L Final     Chloride 04/12/2022 109  94 - 109 mmol/L Final     Carbon Dioxide (CO2) 04/12/2022 21  20 - 32 mmol/L Final     Anion Gap 04/12/2022 7  3 - 14 mmol/L Final     Urea Nitrogen 04/12/2022 9  7 - 30 mg/dL Final     Creatinine 04/12/2022 0.61  0.52 - 1.04 mg/dL Final     Calcium 04/12/2022 8.9  8.5 - 10.1 mg/dL Final     Glucose 04/12/2022 127 (H)  70 - 99 mg/dL Final     Alkaline Phosphatase 04/12/2022 74  40 - 150 U/L Final     AST 04/12/2022 9  0 - 45 U/L Final     ALT 04/12/2022 21  0 - 50 U/L Final     Protein Total 04/12/2022 8.5  6.8 - 8.8 g/dL Final     Albumin  04/12/2022 3.6  3.4 - 5.0 g/dL Final     Bilirubin Total 04/12/2022 <0.1 (L)  0.2 - 1.3 mg/dL Final     GFR Estimate 04/12/2022 >90  >60 mL/min/1.73m2 Final     Lactic Acid 04/12/2022 1.1  0.7 - 2.0 mmol/L Final     CRP Inflammation 04/12/2022 11.9 (H)  0.0 - 8.0 mg/L Final     WBC Count 04/12/2022 8.9  4.0 - 11.0 10e3/uL Final     RBC Count 04/12/2022 4.61  3.80 - 5.20 10e6/uL Final     Hemoglobin 04/12/2022 12.7  11.7 - 15.7 g/dL Final     Hematocrit 04/12/2022 39.2  35.0 - 47.0 % Final     MCV 04/12/2022 85  78 - 100 fL Final     MCH 04/12/2022 27.5  26.5 - 33.0 pg Final     MCHC 04/12/2022 32.4  31.5 - 36.5 g/dL Final     RDW 04/12/2022 14.1  10.0 - 15.0 % Final     Platelet Count 04/12/2022 380  150 - 450 10e3/uL Final     % Neutrophils 04/12/2022 58  % Final     % Lymphocytes 04/12/2022 33  % Final     % Monocytes 04/12/2022 7  % Final     % Eosinophils 04/12/2022 2  % Final     % Basophils 04/12/2022 0  % Final     % Immature Granulocytes 04/12/2022 0  % Final     NRBCs per 100 WBC 04/12/2022 0  <1 /100 Final     Absolute Neutrophils 04/12/2022 5.1  1.6 - 8.3 10e3/uL Final     Absolute Lymphocytes 04/12/2022 3.0  0.8 - 5.3 10e3/uL Final     Absolute Monocytes 04/12/2022 0.6  0.0 - 1.3 10e3/uL Final     Absolute Eosinophils 04/12/2022 0.2  0.0 - 0.7 10e3/uL Final     Absolute Basophils 04/12/2022 0.0  0.0 - 0.2 10e3/uL Final     Absolute Immature Granulocytes 04/12/2022 0.0  <=0.4 10e3/uL Final     Absolute NRBCs 04/12/2022 0.0  10e3/uL Final         Total time spent for face to face visit, reviewing labs/imaging studies, counseling and coordination of care was: 30 Minutes spent on the date of the encounter doing chart review, review of outside records, review of test results, interpretation of tests, patient visit, and documentation     This note was dictated using voice recognition software.  Any grammatical or context distortions are unintentional and inherent to the software.    No orders of the defined  types were placed in this encounter.     New Prescriptions    No medications on file     Modified Medications    No medications on file                 Again, thank you for allowing me to participate in the care of your patient.        Sincerely,        Leonardo Kincaid MD

## 2023-11-10 NOTE — NURSING NOTE
Chief Complaint   Patient presents with    Migraine headache with aura     Annual follow up     Natalee Campbell CMA on 11/10/2023 at 12:18 PM  [facility]

## 2023-12-03 ENCOUNTER — HEALTH MAINTENANCE LETTER (OUTPATIENT)
Age: 28
End: 2023-12-03

## 2024-02-11 ENCOUNTER — HEALTH MAINTENANCE LETTER (OUTPATIENT)
Age: 29
End: 2024-02-11

## 2024-09-08 ENCOUNTER — HEALTH MAINTENANCE LETTER (OUTPATIENT)
Age: 29
End: 2024-09-08

## 2024-11-11 PROBLEM — E11.9 TYPE 2 DIABETES MELLITUS (H): Status: ACTIVE | Noted: 2019-06-11

## 2024-12-20 ENCOUNTER — HOSPITAL ENCOUNTER (EMERGENCY)
Facility: HOSPITAL | Age: 29
Discharge: HOME | End: 2024-12-20
Attending: EMERGENCY MEDICINE | Admitting: EMERGENCY MEDICINE
Payer: COMMERCIAL

## 2024-12-20 ENCOUNTER — APPOINTMENT (OUTPATIENT)
Dept: ULTRASOUND IMAGING | Facility: HOSPITAL | Age: 29
End: 2024-12-20
Payer: COMMERCIAL

## 2024-12-20 VITALS
OXYGEN SATURATION: 98 % | HEIGHT: 62 IN | SYSTOLIC BLOOD PRESSURE: 125 MMHG | RESPIRATION RATE: 18 BRPM | BODY MASS INDEX: 37.36 KG/M2 | HEART RATE: 90 BPM | DIASTOLIC BLOOD PRESSURE: 76 MMHG | WEIGHT: 203 LBS | TEMPERATURE: 98.5 F

## 2024-12-20 DIAGNOSIS — R10.9 ABDOMINAL PAIN: ICD-10-CM

## 2024-12-20 DIAGNOSIS — B37.31 YEAST INFECTION OF THE VAGINA: ICD-10-CM

## 2024-12-20 LAB
ALBUMIN SERPL BCG-MCNC: 4.4 G/DL (ref 3.5–5.2)
ALBUMIN UR-MCNC: 10 MG/DL
ALP SERPL-CCNC: 93 U/L (ref 40–150)
ALT SERPL W P-5'-P-CCNC: 14 U/L (ref 0–50)
ANION GAP SERPL CALCULATED.3IONS-SCNC: 13 MMOL/L (ref 7–15)
APPEARANCE UR: CLEAR
AST SERPL W P-5'-P-CCNC: 13 U/L (ref 0–45)
BACTERIA #/AREA URNS HPF: ABNORMAL /HPF
BILIRUB DIRECT SERPL-MCNC: <0.2 MG/DL (ref 0–0.3)
BILIRUB SERPL-MCNC: 0.2 MG/DL
BILIRUB UR QL STRIP: NEGATIVE
BUN SERPL-MCNC: 10.7 MG/DL (ref 6–20)
CALCIUM SERPL-MCNC: 9.7 MG/DL (ref 8.8–10.4)
CHLORIDE SERPL-SCNC: 105 MMOL/L (ref 98–107)
CLUE CELLS: ABNORMAL
COLOR UR AUTO: ABNORMAL
CREAT SERPL-MCNC: 0.75 MG/DL (ref 0.51–0.95)
EGFRCR SERPLBLD CKD-EPI 2021: >90 ML/MIN/1.73M2
ERYTHROCYTE [DISTWIDTH] IN BLOOD BY AUTOMATED COUNT: 14.4 % (ref 10–15)
GLUCOSE SERPL-MCNC: 118 MG/DL (ref 70–99)
GLUCOSE UR STRIP-MCNC: NEGATIVE MG/DL
HCG INTACT+B SERPL-ACNC: <1 MIU/ML
HCO3 SERPL-SCNC: 22 MMOL/L (ref 22–29)
HCT VFR BLD AUTO: 37.1 % (ref 35–47)
HGB BLD-MCNC: 12.2 G/DL (ref 11.7–15.7)
HGB UR QL STRIP: ABNORMAL
KETONES UR STRIP-MCNC: NEGATIVE MG/DL
LEUKOCYTE ESTERASE UR QL STRIP: NEGATIVE
LIPASE SERPL-CCNC: 18 U/L (ref 13–60)
MCH RBC QN AUTO: 27.9 PG (ref 26.5–33)
MCHC RBC AUTO-ENTMCNC: 32.9 G/DL (ref 31.5–36.5)
MCV RBC AUTO: 85 FL (ref 78–100)
MUCOUS THREADS #/AREA URNS LPF: PRESENT /LPF
NITRATE UR QL: NEGATIVE
PH UR STRIP: 5.5 [PH] (ref 5–7)
PLATELET # BLD AUTO: 360 10E3/UL (ref 150–450)
POTASSIUM SERPL-SCNC: 3.9 MMOL/L (ref 3.4–5.3)
PROT SERPL-MCNC: 7.8 G/DL (ref 6.4–8.3)
RBC # BLD AUTO: 4.38 10E6/UL (ref 3.8–5.2)
RBC URINE: 1 /HPF
SODIUM SERPL-SCNC: 140 MMOL/L (ref 135–145)
SP GR UR STRIP: 1.03 (ref 1–1.03)
SQUAMOUS EPITHELIAL: 2 /HPF
TRICHOMONAS, WET PREP: ABNORMAL
UROBILINOGEN UR STRIP-MCNC: <2 MG/DL
WBC # BLD AUTO: 8.1 10E3/UL (ref 4–11)
WBC URINE: 0 /HPF
WBC'S/HIGH POWER FIELD, WET PREP: ABNORMAL
YEAST, WET PREP: PRESENT

## 2024-12-20 PROCEDURE — 84702 CHORIONIC GONADOTROPIN TEST: CPT

## 2024-12-20 PROCEDURE — 87491 CHLMYD TRACH DNA AMP PROBE: CPT

## 2024-12-20 PROCEDURE — 85048 AUTOMATED LEUKOCYTE COUNT: CPT

## 2024-12-20 PROCEDURE — 96375 TX/PRO/DX INJ NEW DRUG ADDON: CPT

## 2024-12-20 PROCEDURE — 76830 TRANSVAGINAL US NON-OB: CPT

## 2024-12-20 PROCEDURE — 258N000003 HC RX IP 258 OP 636: Performed by: EMERGENCY MEDICINE

## 2024-12-20 PROCEDURE — 80053 COMPREHEN METABOLIC PANEL: CPT

## 2024-12-20 PROCEDURE — 85014 HEMATOCRIT: CPT

## 2024-12-20 PROCEDURE — 250N000011 HC RX IP 250 OP 636

## 2024-12-20 PROCEDURE — 99285 EMERGENCY DEPT VISIT HI MDM: CPT | Mod: 25

## 2024-12-20 PROCEDURE — 83690 ASSAY OF LIPASE: CPT

## 2024-12-20 PROCEDURE — 81003 URINALYSIS AUTO W/O SCOPE: CPT

## 2024-12-20 PROCEDURE — 250N000013 HC RX MED GY IP 250 OP 250 PS 637

## 2024-12-20 PROCEDURE — 82248 BILIRUBIN DIRECT: CPT

## 2024-12-20 PROCEDURE — 96374 THER/PROPH/DIAG INJ IV PUSH: CPT

## 2024-12-20 PROCEDURE — 82374 ASSAY BLOOD CARBON DIOXIDE: CPT

## 2024-12-20 PROCEDURE — 96361 HYDRATE IV INFUSION ADD-ON: CPT

## 2024-12-20 PROCEDURE — 87210 SMEAR WET MOUNT SALINE/INK: CPT

## 2024-12-20 PROCEDURE — 36415 COLL VENOUS BLD VENIPUNCTURE: CPT

## 2024-12-20 PROCEDURE — 93976 VASCULAR STUDY: CPT

## 2024-12-20 RX ORDER — KETOROLAC TROMETHAMINE 15 MG/ML
15 INJECTION, SOLUTION INTRAMUSCULAR; INTRAVENOUS ONCE
Status: COMPLETED | OUTPATIENT
Start: 2024-12-20 | End: 2024-12-20

## 2024-12-20 RX ORDER — DICYCLOMINE HCL 20 MG
20 TABLET ORAL 2 TIMES DAILY
Qty: 20 TABLET | Refills: 0 | Status: SHIPPED | OUTPATIENT
Start: 2024-12-20 | End: 2024-12-30

## 2024-12-20 RX ORDER — DOXYCYCLINE 100 MG/1
100 CAPSULE ORAL 2 TIMES DAILY
Qty: 13 CAPSULE | Refills: 0 | Status: SHIPPED | OUTPATIENT
Start: 2024-12-20 | End: 2024-12-27

## 2024-12-20 RX ORDER — ONDANSETRON 2 MG/ML
4 INJECTION INTRAMUSCULAR; INTRAVENOUS ONCE
Status: COMPLETED | OUTPATIENT
Start: 2024-12-20 | End: 2024-12-20

## 2024-12-20 RX ORDER — FLUCONAZOLE 150 MG/1
150 TABLET ORAL ONCE
Qty: 1 TABLET | Refills: 0 | Status: SHIPPED | OUTPATIENT
Start: 2024-12-20 | End: 2024-12-20

## 2024-12-20 RX ORDER — DOXYCYCLINE 100 MG/1
100 CAPSULE ORAL ONCE
Status: COMPLETED | OUTPATIENT
Start: 2024-12-20 | End: 2024-12-20

## 2024-12-20 RX ORDER — CEFTRIAXONE 500 MG/1
500 INJECTION, POWDER, FOR SOLUTION INTRAMUSCULAR; INTRAVENOUS ONCE
Status: DISCONTINUED | OUTPATIENT
Start: 2024-12-20 | End: 2024-12-20 | Stop reason: DRUGHIGH

## 2024-12-20 RX ADMIN — SODIUM CHLORIDE 1000 ML: 9 INJECTION, SOLUTION INTRAVENOUS at 13:45

## 2024-12-20 RX ADMIN — ONDANSETRON 4 MG: 2 INJECTION INTRAMUSCULAR; INTRAVENOUS at 13:45

## 2024-12-20 RX ADMIN — KETOROLAC TROMETHAMINE 15 MG: 15 INJECTION, SOLUTION INTRAMUSCULAR; INTRAVENOUS at 13:45

## 2024-12-20 RX ADMIN — DOXYCYCLINE 100 MG: 100 CAPSULE ORAL at 16:47

## 2024-12-20 ASSESSMENT — ACTIVITIES OF DAILY LIVING (ADL)
ADLS_ACUITY_SCORE: 41

## 2024-12-20 ASSESSMENT — COLUMBIA-SUICIDE SEVERITY RATING SCALE - C-SSRS
1. IN THE PAST MONTH, HAVE YOU WISHED YOU WERE DEAD OR WISHED YOU COULD GO TO SLEEP AND NOT WAKE UP?: NO
6. HAVE YOU EVER DONE ANYTHING, STARTED TO DO ANYTHING, OR PREPARED TO DO ANYTHING TO END YOUR LIFE?: NO
2. HAVE YOU ACTUALLY HAD ANY THOUGHTS OF KILLING YOURSELF IN THE PAST MONTH?: NO

## 2024-12-20 NOTE — ED PROVIDER NOTES
"Emergency Department Staff Physician Note     I had a face to face encounter with this patient seen by the Advanced Practice Provider (MILTON).  I have seen, examined, and discussed the patient with the MILTON and agree with their assessment and plan of management.    Relevant HPI:     Elizabeth More is a 29 year old female who presents to the Emergency Department for evaluation of abdominal pain.    3 days of lower abdominal pain with radiation to her back. 2 days ago, she was seen at [facility] with an unremarkable workup. Pain persisted and she went to [facility] 1 day ago with more unremarkable workup. Today, patient still not improved. She did note having nausea and vomiting that is now resolved.      I, Darius Delatorre, am serving as a scribe to document services personally performed by Ridge Bautista D.O., based on my observations and the provider's statements to me.   I, Ridge Bautista D.O., attest that Darius Delatorre was acting in a scribe capacity, has observed my performance of the services and has documented them in accordance with my direction.    ED Course:  12:57 PM I received the patient report from the MILTON, Laura Moralez PA-C. I agree with their assessment and plan of management, and I will see the patient.  1:05 PM I met with the patient to introduce myself, gather additional history, perform my initial exam, and discuss the plan.     Brief Physical Exam:  VITAL SIGNS: /76   Pulse 90   Temp 98.5  F (36.9  C) (Oral)   Resp 18   Ht 1.575 m (5' 2\")   Wt 92.1 kg (203 lb)   SpO2 98%   BMI 37.13 kg/m      General Appearance: Well-appearing, well-nourished, no acute distress   Head:  Normocephalic, without obvious abnormality, atraumatic  Eyes:  PERRL, conjunctiva/corneas clear, EOM's intact,  ENT:  Lips, mucosa, and tongue normal, membranes are moist without pallor  Neck:  Normal ROM, symmetrical, trachea midline    Cardio:  Regular rate and rhythm, no murmur, rub or gallop, 2+ pulses " symmetric in all extremities  Pulm:  Clear to auscultation bilaterally, respirations unlabored,   Abdomen:  Soft, no rebound or guarding. Bilateral lower quadrant tenderness.  Musculoskeletal: Full ROM, no edema, no cyanosis, good ROM of major joints  Integument:  Warm, Dry, No erythema, No rash.    Neurologic:  Alert & oriented.  No focal deficits appreciated.  Ambulatory.  Psychiatric:  Affect normal, Judgment normal, Mood normal.        LABS  Results for orders placed or performed during the hospital encounter of 12/20/24 (from the past 24 hours)   CBC with platelets   Result Value Ref Range    WBC Count 8.1 4.0 - 11.0 10e3/uL    RBC Count 4.38 3.80 - 5.20 10e6/uL    Hemoglobin 12.2 11.7 - 15.7 g/dL    Hematocrit 37.1 35.0 - 47.0 %    MCV 85 78 - 100 fL    MCH 27.9 26.5 - 33.0 pg    MCHC 32.9 31.5 - 36.5 g/dL    RDW 14.4 10.0 - 15.0 %    Platelet Count 360 150 - 450 10e3/uL   Basic metabolic panel   Result Value Ref Range    Sodium 140 135 - 145 mmol/L    Potassium 3.9 3.4 - 5.3 mmol/L    Chloride 105 98 - 107 mmol/L    Carbon Dioxide (CO2) 22 22 - 29 mmol/L    Anion Gap 13 7 - 15 mmol/L    Urea Nitrogen 10.7 6.0 - 20.0 mg/dL    Creatinine 0.75 0.51 - 0.95 mg/dL    GFR Estimate >90 >60 mL/min/1.73m2    Calcium 9.7 8.8 - 10.4 mg/dL    Glucose 118 (H) 70 - 99 mg/dL   Hepatic function panel   Result Value Ref Range    Protein Total 7.8 6.4 - 8.3 g/dL    Albumin 4.4 3.5 - 5.2 g/dL    Bilirubin Total 0.2 <=1.2 mg/dL    Alkaline Phosphatase 93 40 - 150 U/L    AST 13 0 - 45 U/L    ALT 14 0 - 50 U/L    Bilirubin Direct <0.20 0.00 - 0.30 mg/dL   Lipase   Result Value Ref Range    Lipase 18 13 - 60 U/L   HCG quantitative pregnancy (blood)   Result Value Ref Range    hCG Quantitative <1 <5 mIU/mL   UA with Microscopic reflex to Culture    Specimen: Urinary Bladder; Urine   Result Value Ref Range    Color Urine Light Yellow Colorless, Straw, Light Yellow, Yellow    Appearance Urine Clear Clear    Glucose Urine Negative  Negative mg/dL    Bilirubin Urine Negative Negative    Ketones Urine Negative Negative mg/dL    Specific Gravity Urine 1.028 1.001 - 1.030    Blood Urine 0.03 mg/dL (A) Negative    pH Urine 5.5 5.0 - 7.0    Protein Albumin Urine 10 (A) Negative mg/dL    Urobilinogen Urine <2.0 <2.0 mg/dL    Nitrite Urine Negative Negative    Leukocyte Esterase Urine Negative Negative    Bacteria Urine Few (A) None Seen /HPF    Mucus Urine Present (A) None Seen /LPF    RBC Urine 1 <=2 /HPF    WBC Urine 0 <=5 /HPF    Squamous Epithelials Urine 2 (H) <=1 /HPF    Narrative    Urine Culture not indicated   US Pelvis Cmplt w Transvag & Doppler LmtPel Duplex Limited    Narrative    EXAM: US PELVIS COMPLETE W TRANSVAGINAL AND DOPPLER LIMITED  LOCATION: [facility]  DATE: 12/20/2024    INDICATION: Lower abdominal pain, increased vaginal discharge, eval for TOA signs of PID  COMPARISON: CT AP 12/19/2024 [facility]  TECHNIQUE: Transabdominal scans were performed. Endovaginal ultrasound was performed to better visualize the adnexa. Color flow with spectral Doppler and waveform analysis performed.    FINDINGS:    UTERUS: 6 x 3 x 2 cm. Normal in size and position with no masses.    ENDOMETRIUM: 2 mm. Normal smooth endometrium.    RIGHT OVARY: 1.9 x 1.8 x 1.4 cm. Normal with arterial and venous duplex flow identified.    LEFT OVARY: 2.5 x 1.4 x 1.2 cm. Normal with arterial and venous duplex flow identified.    No free fluid.      Impression    IMPRESSION:    1.  Normal pelvic ultrasound.         Wet prep    Specimen: Vagina; Swab   Result Value Ref Range    Trichomonas Absent Absent    Yeast Present (A) Absent    Clue Cells Absent Absent    WBCs/high power field 2+ (A) None         RADIOLOGY  US Pelvis Cmplt w Transvag & Doppler LmtPel Duplex Limited   Final Result   IMPRESSION:     1.  Normal pelvic ultrasound.                        Impression / ED Plan:  I had a face to face encounter with this patient seen by the  Advanced Practice Provider (MILTON). I personally made/approved the management plan and take responsibility for the patient management. I personally saw patient and performed a substantive portion of the visit including all aspects of the medical decision making.     Elizabeth More is a 29 year old female presents to the ED for evaluation of lower abdominal pain.  Patient has had this pain for the past few days.  Previously seen at [facility].  Did have CT imaging performed there, which did not show any evidence of acute process.  Came in today due to continuing symptoms.  On exam her pain was primarily located in the lower abdomen, without definitive focal tenderness.  There was no rebound tenderness to her exam.  She does not appear septic, and is otherwise quite comfortable on exam today.  Based on clinical exam and history given, I did not believe CT imaging was indicated again, especially with the lab testing that does not show an elevation in her white blood cell count, was otherwise largely unremarkable.  However they did not perform previous pelvic exam or pelvic ultrasound at her visit at [facility], therefore we will perform these today.  Performed by the MILTON, there was small amount of discharge, but otherwise the exam was largely unremarkable.  Pelvic ultrasound did not show any evidence of ovarian cyst or torsion or obvious significant free fluid.  As a precaution, with the discharge that was noted on exam we we will treat prophylactically for potential STI, though based on the history given I believe it to be of low likelihood.  Do not see evidence of other emergent process and will discharge with plan for outpatient follow-up with primary care provider.  Return precautions were discussed.    1. Yeast infection of the vagina    2. Abdominal pain        Ridge Bautista D.O.  Emergency Medicine  [facility] EMERGENCY DEPARTMENT  [facility] [facility]  [id number]  [phone]  Dept: [phone]       Ridge Bautista,   12/23/24 0814

## 2024-12-20 NOTE — DISCHARGE INSTRUCTIONS
You were seen in the emergency department for evaluation of abdominal pain.  It was found that you have a yeast infection.  We discussed covering for sexually transmitted infections and decided that this was a good idea.  You got an IV medication here and 1 dose of an oral medication as well.  You need to take the pills that I prescribed you, doxycycline, 2 times a day for the next 7 days.  You got 1 dose here tonight you do not need to take it until tomorrow morning.  Then please take 2 times a day until the pills are gone.  Take the pill for the yeast infection, fluconazole, as prescribed.  This is just a one-time dose.     Use Tylenol and ibuprofen as needed for your abdominal pain.  Use the Bentyl that I prescribed you as well to help with the abdominal cramping.  Use the Zofran that you have at home as needed for nausea and vomiting.    Schedule an appoint with your primary care provider for an emergency department follow-up.    Return to the emergency department for fever, uncontrollable pain, uncontrollable vomiting, or any other concerning symptoms.

## 2024-12-20 NOTE — ED TRIAGE NOTES
Pt presents with the c/o lower abdominal pain that radiates to groin with nausea x 3 days.  Reports increased frequency with urination.  No pain.  Normal bowel.     Triage Assessment (Adult)       Row Name 12/20/24 1233          Triage Assessment    Airway WDL WDL        Respiratory WDL    Respiratory WDL WDL

## 2024-12-20 NOTE — ED NOTES
Patient stated her ride was here to pick her up and needed to leave. Writer explained that we were waiting on medication from pharmacy, patient stated she did not want to wait for med because she was going to take the PO dose that was prescribed.

## 2024-12-20 NOTE — ED PROVIDER NOTES
Emergency Department Encounter   NAME: Elizabeth More  AGE: 29 year old female  YOB: 1995  MRN: [id number]    PCP: No Ref-Primary, Physician  ED PROVIDER: Laura Moralez PA-C    Evaluation Date & Time:   No admission date for patient encounter.    CHIEF COMPLAINT:  Abdominal Pain      Impression and Plan   MDM: 30 yo F with a history of obesity, asthma, and type 2 diabetes presents for evaluation of 3 days of lower abdominal pain.  Had some nausea and vomiting that has resolved.  Increased vaginal discharge, change in vaginal odor, and increased urinary frequency.  On arrival here patient is vitally stable.  Afebrile.  On exam patient is in no acute distress.  She has some mild generalized abdominal pain but this is worst in the suprapubic area.  No peritoneal signs.  Unremarkable cardiac and pulmonary exams.    On chart review:   CT abdomen pelvis 12/19/2024: No acute abnormality.    [place] emergency department 12/19/2024: Periumbilical pain radiating to the low back.  Diffuse abdominal tenderness.  Unremarkable lab work.  CT was entirely unremarkable.  Patient appears much more comfortable after fluids Zofran and morphine.  Symptoms likely secondary to viral illness.  Discharged home with Bentyl, Maalox, and Zofran.     Patient had a CT scan done yesterday and has not had a change in her symptoms.  She does still have her appendix but CT scan yesterday shows no evidence of appendicitis, bowel obstruction, diverticulitis.  It is without any acute abnormalities.  We discussed possibility of STD, UTI, PID, gastritis, viral illness. DKA.  We discussed plans for repeat abdominal labs.  If there is a large change or abnormality in these labs we can consider repeating CT scan.  The plan at this time is for repeat labs, pelvic exam, and pelvic ultrasound which patient is agreeable to.    Labs here without any leukocytosis concerning for ongoing systemic infection or inflammatory response.  BMP  without any concerning electrolyte abnormalities.  Glucose is only mildly elevated at 118 and she does not have an anion gap.  Not consistent with DKA.  Lipase is normal, pancreatitis is unlikely.  Hepatobiliary obstruction is unlikely given normal LFTs.  UA without signs of infection.  Pregnancy test negative.  Wet prep positive only for yeast and WBCs.  G/C testing pending.    Pelvic exam chaperoned by ANDRES Ramos with normal external genitalia.  Small amount of yellow/white vaginal discharge.  No cervical motion tenderness.  Cervix is closed.. Pelvic ultrasound without any acute abnormalities.     Patient is feeling much better after Zofran and Toradol.  We discussed all lab and imaging results.  She would like these to be treated.  Fluconazole was ordered.  We discussed possible prophylactic treatment for gonorrhea and chlamydia.  She would like to proceed with this.  I ordered IM Rocephin and doxycycline.  She will get this and go home with prescriptions for the rest of the doxycyclin, fluconazole, and Bentyl.  She has Zofran at home that she will continue to use as needed she is can follow-up with her primary care provider.  We reviewed strict return precautions, signs of worsening infection, and patient was discharged home in stable condition.    I have staffed the patient with Dr. Bautista, ED physician, who will evaluate the patient and agrees with all aspects of today's care.          Medical Decision Making  Obtained supplemental history:Supplemental history obtained?: No  Reviewed external records: External records reviewed?: Documented in chart  Care impacted by chronic illness:Documented in Chart  Did you consider but not order tests?: Work up considered but not performed and documented in chart, if applicable  Did you interpret images independently?: Independent interpretation of ECG and images noted in documentation, when applicable.  Consultation discussion with other provider:Did you involve another  provider (consultant, , pharmacy, etc.)?: No  Discharge. I prescribed additional prescription strength medication(s) as charted. See documentation for any additional details.    MIPS: Not Applicable        FINAL IMPRESSION:    ICD-10-CM    1. Yeast infection of the vagina  B37.31       2. Abdominal pain  R10.9             MEDICATIONS GIVEN IN THE EMERGENCY DEPARTMENT:  Medications   ondansetron (ZOFRAN) injection 4 mg (4 mg Intravenous $Given 12/20/24 1345)   ketorolac (TORADOL) injection 15 mg (15 mg Intravenous $Given 12/20/24 1345)   sodium chloride 0.9% BOLUS 1,000 mL (0 mLs Intravenous Stopped 12/20/24 1519)   doxycycline monohydrate (MONODOX) capsule 100 mg (100 mg Oral $Given 12/20/24 1647)   cefTRIAXone (ROCEPHIN) 500 mg in lidocaine injection (500 mg Intramuscular Not Given 12/20/24 1652)         NEW PRESCRIPTIONS STARTED AT TODAY'S ED VISIT:  Discharge Medication List as of 12/20/2024  4:39 PM        START taking these medications    Details   dicyclomine (BENTYL) 20 MG tablet Take 1 tablet (20 mg) by mouth 2 times daily for 10 days., Disp-20 tablet, R-0, E-Prescribe      doxycycline hyclate (VIBRAMYCIN) 100 MG capsule Take 1 capsule (100 mg) by mouth 2 times daily for 7 days., Disp-13 capsule, R-0, E-Prescribe      fluconazole (DIFLUCAN) 150 MG tablet Take 1 tablet (150 mg) by mouth once for 1 dose., Disp-1 tablet, R-0, E-Prescribe               HPI   Patient information was obtained from: patient   Use of Intrepreter: N/A     Elizabeth Gregg Derik is a 29 year old female with a pertinent history of obesity, asthma, type 2 diabetes who presents to the ED by car for evaluation of lower abdominal pain.  3 days of lower abdominal pain that radiates to her back.  Some increased urinary frequency but no dysuria or hematuria.  No diarrhea, melena, hematochezia.  At one point had some nausea and vomiting but this has subsided.  No fevers.  No history of abdominal surgery.  Remote history of STIs when she  "was 14.  No new sexual partners.  She is not currently sexually active.  Does report increased vaginal discharge and change in vaginal odor.  She was seen at [facility] 2 days ago and at [facility] yesterday.  Thought that her pain is secondary to a viral infection, but patient is unsure of this.        REVIEW OF SYSTEMS:  Pertinent positive and negative symptoms per HPI.       Physical Exam     First Vitals:  Patient Vitals for the past 24 hrs:   BP Temp Temp src Pulse Resp SpO2 Height Weight   12/20/24 1231 125/76 98.5  F (36.9  C) Oral 90 18 98 % 1.575 m (5' 2\") 92.1 kg (203 lb)       PHYSICAL EXAM:   General Appearance:  Alert, cooperative, no distress, appears stated age  HENT: Normocephalic without obvious deformity, atraumatic. Mucous membranes moist   Eyes: Conjunctiva clear, Lids normal. No discharge.   Respiratory: No distress. Lungs clear to ausculation bilaterally. No wheezes, rhonchi or stridor  Cardiovascular: Regular rate and rhythm, no murmur. Normal cap refill. No peripheral edema  GI: Mild generalized abdominal tenderness palpation worst in the suprapubic area without any rigidity, rebound, guarding, or distention.  : No CVA tenderness  Musculoskeletal: Moving all extremities. No gross deformities  Integument: Warm, dry, no rashes or lesions  Neurologic: Alert and orientated x3.   Psych: Normal mood and affect      Results     LAB:  All pertinent labs reviewed and interpreted  Labs Ordered and Resulted from Time of ED Arrival to Time of ED Departure   BASIC METABOLIC PANEL - Abnormal       Result Value    Sodium 140      Potassium 3.9      Chloride 105      Carbon Dioxide (CO2) 22      Anion Gap 13      Urea Nitrogen 10.7      Creatinine 0.75      GFR Estimate >90      Calcium 9.7      Glucose 118 (*)    ROUTINE UA WITH MICROSCOPIC REFLEX TO CULTURE - Abnormal    Color Urine Light Yellow      Appearance Urine Clear      Glucose Urine Negative      Bilirubin Urine Negative      Ketones Urine Negative   "    Specific Gravity Urine 1.028      Blood Urine 0.03 mg/dL (*)     pH Urine 5.5      Protein Albumin Urine 10 (*)     Urobilinogen Urine <2.0      Nitrite Urine Negative      Leukocyte Esterase Urine Negative      Bacteria Urine Few (*)     Mucus Urine Present (*)     RBC Urine 1      WBC Urine 0      Squamous Epithelials Urine 2 (*)    WET PREPARATION - Abnormal    Trichomonas Absent      Yeast Present (*)     Clue Cells Absent      WBCs/high power field 2+ (*)    CBC WITH PLATELETS - Normal    WBC Count 8.1      RBC Count 4.38      Hemoglobin 12.2      Hematocrit 37.1      MCV 85      MCH 27.9      MCHC 32.9      RDW 14.4      Platelet Count 360     HEPATIC FUNCTION PANEL - Normal    Protein Total 7.8      Albumin 4.4      Bilirubin Total 0.2      Alkaline Phosphatase 93      AST 13      ALT 14      Bilirubin Direct <0.20     LIPASE - Normal    Lipase 18     HCG QUANTITATIVE PREGNANCY - Normal    hCG Quantitative <1     CHLAMYDIA TRACHOMATIS/NEISSERIA GONORRHOEAE BY PCR       RADIOLOGY:  US Pelvis Cmplt w Transvag & Doppler LmtPel Duplex Limited   Final Result   IMPRESSION:     1.  Normal pelvic ultrasound.                       Laura Moralez PA-C   Emergency Medicine   [facility] EMERGENCY DEPARTMENT       Laura Moralez PA-C  12/20/24 1943

## 2024-12-21 LAB
C TRACH DNA SPEC QL PROBE+SIG AMP: NEGATIVE
N GONORRHOEA DNA SPEC QL NAA+PROBE: NEGATIVE

## 2025-01-05 ENCOUNTER — HEALTH MAINTENANCE LETTER (OUTPATIENT)
Age: 30
End: 2025-01-05